# Patient Record
Sex: FEMALE | URBAN - METROPOLITAN AREA
[De-identification: names, ages, dates, MRNs, and addresses within clinical notes are randomized per-mention and may not be internally consistent; named-entity substitution may affect disease eponyms.]

---

## 2020-04-21 ENCOUNTER — OFFICE VISIT (OUTPATIENT)
Dept: URBAN - METROPOLITAN AREA CLINIC 76 | Facility: CLINIC | Age: 65
End: 2020-04-21
Payer: COMMERCIAL

## 2020-04-21 DIAGNOSIS — H57.052 TONIC PUPIL, LEFT EYE: ICD-10-CM

## 2020-04-21 DIAGNOSIS — H52.4 PRESBYOPIA: ICD-10-CM

## 2020-04-21 DIAGNOSIS — H25.13 AGE-RELATED NUCLEAR CATARACT, BILATERAL: Primary | ICD-10-CM

## 2020-04-21 PROCEDURE — 92004 COMPRE OPH EXAM NEW PT 1/>: CPT | Performed by: OPHTHALMOLOGY

## 2020-04-21 ASSESSMENT — INTRAOCULAR PRESSURE
OS: 16
OD: 14

## 2020-04-21 NOTE — IMPRESSION/PLAN
Impression: Age-related nuclear cataract, bilateral: H25.13. Bilateral.
most likely cause for color perception difficulties. Plan: Cataracts account for the patient's complaints. No treatment currently recommended. The patient will monitor vision changes and contact us with any decrease in vision.

## 2022-10-11 ENCOUNTER — OFFICE VISIT (OUTPATIENT)
Dept: URBAN - METROPOLITAN AREA CLINIC 76 | Facility: CLINIC | Age: 67
End: 2022-10-11
Payer: COMMERCIAL

## 2022-10-11 DIAGNOSIS — H04.123 DRY EYE SYNDROME OF BILATERAL LACRIMAL GLANDS: ICD-10-CM

## 2022-10-11 DIAGNOSIS — H52.13 MYOPIA, BILATERAL: ICD-10-CM

## 2022-10-11 DIAGNOSIS — H25.813 COMBINED FORMS OF AGE-RELATED CATARACT, BILATERAL: Primary | ICD-10-CM

## 2022-10-11 PROCEDURE — 92004 COMPRE OPH EXAM NEW PT 1/>: CPT | Performed by: OPTOMETRIST

## 2022-10-11 ASSESSMENT — VISUAL ACUITY
OS: 20/20
OD: 20/20

## 2022-10-11 ASSESSMENT — INTRAOCULAR PRESSURE
OD: 18
OS: 17

## 2022-10-11 ASSESSMENT — KERATOMETRY
OS: 42.63
OD: 42.88

## 2022-10-11 NOTE — IMPRESSION/PLAN
Impression: Combined forms of age-related cataract, bilateral: H25.813. Plan: Discussed condition. Continue to monitor without treatment at this time.

## 2023-11-16 PROCEDURE — 99214 OFFICE O/P EST MOD 30 MIN: CPT | Performed by: OPTOMETRIST

## 2023-11-16 PROCEDURE — 92014 COMPRE OPH EXAM EST PT 1/>: CPT | Performed by: OPTOMETRIST

## 2025-05-14 ENCOUNTER — OFFICE VISIT (OUTPATIENT)
Dept: URBAN - METROPOLITAN AREA CLINIC 76 | Facility: CLINIC | Age: 70
End: 2025-05-14
Payer: COMMERCIAL

## 2025-05-14 DIAGNOSIS — H52.13 MYOPIA, BILATERAL: Primary | ICD-10-CM

## 2025-05-14 DIAGNOSIS — H04.123 DRY EYE SYNDROME OF BILATERAL LACRIMAL GLANDS: ICD-10-CM

## 2025-05-14 DIAGNOSIS — H57.052 TONIC PUPIL, LEFT EYE: ICD-10-CM

## 2025-05-14 DIAGNOSIS — H25.813 COMBINED FORMS OF AGE-RELATED CATARACT, BILATERAL: ICD-10-CM

## 2025-05-14 PROCEDURE — 99214 OFFICE O/P EST MOD 30 MIN: CPT | Performed by: OPTOMETRIST

## 2025-05-14 ASSESSMENT — INTRAOCULAR PRESSURE
OD: 15
OS: 14

## 2025-05-14 ASSESSMENT — KERATOMETRY
OD: 42.63
OS: 42.88

## 2025-05-14 ASSESSMENT — VISUAL ACUITY
OD: 20/20
OS: 20/20